# Patient Record
(demographics unavailable — no encounter records)

---

## 2024-11-20 NOTE — PHYSICAL EXAM
[Chaperone Present] : A chaperone was present in the examining room during all aspects of the physical examination [[id number]] : A chaperone was present during the pelvic exam. [Normal] : Anus and perineum: Normal sphincter tone, no masses, no prolapse. [Abnormal] : Recto-Vaginal Exam: Abnormal [Fully active, able to carry on all pre-disease performance without restriction] : Status 0 - Fully active, able to carry on all pre-disease performance without restriction [de-identified] : small umbilical hernia  [de-identified] : Palpable masses in cul de sac, minimal mobility

## 2024-11-20 NOTE — HISTORY OF PRESENT ILLNESS
[FreeTextEntry1] : Referred for bilateral complex adnexal masses by Dr Goss  35 yo with complex bilateral adnexal masses on MRI Known since approx 2019 or 2020.  Both enlarging but L faster than right with internal changes, and solid nodularity.   States she first found out prior to pandemic had what sounds like intermittent torsion but this was March 2020 and further follow up was delayed. Was going to a clinic in [facility] with [facility]. Then went to [facility] and intervention further delayed.   She is asymptomatic. Normal meses reported, some pain and cramps, sometimes heavy, no intermenstrual bleeding. Strongly desires fertility.   MRI 9- Uterine size 10.1 x 4.7 x 5.1 junctional zone masslike thickening pattern posteriorly maximal thickness up to 2.4 cm previously 2.2 cm endometrial canal unremarkable 4 mm maximal thickness. Right adnexa marked architectural distortion complex mass 7.6 x 5.8 x 6.1 previous MRI imaging 6.1 x 4.2 x 6.4  Left adnexa: Abnormally enlarged ovary 8.8 x 8.8 x 7.2 bilocular mass almost entirely replacing the ovary previously measured 6.6 x 5.7 x 5.5 internal architecture features appear to have evolved somewhat since prior examination interval enlargement meant of rounded nodular focus favors Rokitansky nodule.  Irregular internal reticulations suggesting presence of hair Small amount of free fluid No lymphadenopathy Small fat-containing umbilical hernia Impression: Interval enlargement of bilateral ovarian masses more so on the left.  Complete characterization limited due to examination constraints imaging features compatible with bilateral teratomas with significant interval enlargement on the left left side malignant degeneration cannot be excluded.  HCM: Pap: 2024 wnl NILM HPV neg Mammo:n/a C-scope 2017 IBS-C PMH: Obesity, preDM PSH: Denies Gyn Hx: Hx of cysts, adenomyosis  Ob Hx:   Meds: Ozempic Allergies: NKDA FH: maternal grandmother breast SH: Never smoker occ etoh Works as [employer], Single, not sexually active

## 2024-11-20 NOTE — PHYSICAL EXAM
[Chaperone Present] : A chaperone was present in the examining room during all aspects of the physical examination [89331] : A chaperone was present during the pelvic exam. [Normal] : Anus and perineum: Normal sphincter tone, no masses, no prolapse. [Abnormal] : Recto-Vaginal Exam: Abnormal [Fully active, able to carry on all pre-disease performance without restriction] : Status 0 - Fully active, able to carry on all pre-disease performance without restriction [de-identified] : small umbilical hernia  [de-identified] : Palpable masses in cul de sac, minimal mobility

## 2024-11-20 NOTE — HISTORY OF PRESENT ILLNESS
[FreeTextEntry1] : Referred for bilateral complex adnexal masses by Dr Goss  37 yo with complex bilateral adnexal masses on MRI Known since approx 2019 or 2020.  Both enlarging but L faster than right with internal changes, and solid nodularity.   States she first found out prior to pandemic had what sounds like intermittent torsion but this was March 2020 and further follow up was delayed. Was going to a clinic in [facility] with [facility]. Then went to [place] and intervention further delayed.   She is asymptomatic. Normal meses reported, some pain and cramps, sometimes heavy, no intermenstrual bleeding. Strongly desires fertility.   MRI 9- Uterine size 10.1 x 4.7 x 5.1 junctional zone masslike thickening pattern posteriorly maximal thickness up to 2.4 cm previously 2.2 cm endometrial canal unremarkable 4 mm maximal thickness. Right adnexa marked architectural distortion complex mass 7.6 x 5.8 x 6.1 previous MRI imaging 6.1 x 4.2 x 6.4  Left adnexa: Abnormally enlarged ovary 8.8 x 8.8 x 7.2 bilocular mass almost entirely replacing the ovary previously measured 6.6 x 5.7 x 5.5 internal architecture features appear to have evolved somewhat since prior examination interval enlargement meant of rounded nodular focus favors Rokitansky nodule.  Irregular internal reticulations suggesting presence of hair Small amount of free fluid No lymphadenopathy Small fat-containing umbilical hernia Impression: Interval enlargement of bilateral ovarian masses more so on the left.  Complete characterization limited due to examination constraints imaging features compatible with bilateral teratomas with significant interval enlargement on the left left side malignant degeneration cannot be excluded.  HCM: Pap: 2024 wnl NILM HPV neg Mammo:n/a C-scope 2017 IBS-C PMH: Obesity, preDM PSH: Denies Gyn Hx: Hx of cysts, adenomyosis  Ob Hx:   Meds: Ozempic Allergies: NKDA FH: maternal grandmother breast SH: Never smoker occ etoh Works as [employer], Single, not sexually active

## 2024-11-20 NOTE — HISTORY OF PRESENT ILLNESS
[FreeTextEntry1] : Referred for bilateral complex adnexal masses by Dr Goss  35 yo with complex bilateral adnexal masses on MRI Known since approx 2019 or 2020.  Both enlarging but L faster than right with internal changes, and solid nodularity.   States she first found out prior to pandemic had what sounds like intermittent torsion but this was March 2020 and further follow up was delayed. Was going to a clinic in [facility] with [facility]. Then went to [facility] and intervention further delayed.   She is asymptomatic. Normal meses reported, some pain and cramps, sometimes heavy, no intermenstrual bleeding. Strongly desires fertility.   MRI 9- Uterine size 10.1 x 4.7 x 5.1 junctional zone masslike thickening pattern posteriorly maximal thickness up to 2.4 cm previously 2.2 cm endometrial canal unremarkable 4 mm maximal thickness. Right adnexa marked architectural distortion complex mass 7.6 x 5.8 x 6.1 previous MRI imaging 6.1 x 4.2 x 6.4  Left adnexa: Abnormally enlarged ovary 8.8 x 8.8 x 7.2 bilocular mass almost entirely replacing the ovary previously measured 6.6 x 5.7 x 5.5 internal architecture features appear to have evolved somewhat since prior examination interval enlargement meant of rounded nodular focus favors Rokitansky nodule.  Irregular internal reticulations suggesting presence of hair Small amount of free fluid No lymphadenopathy Small fat-containing umbilical hernia Impression: Interval enlargement of bilateral ovarian masses more so on the left.  Complete characterization limited due to examination constraints imaging features compatible with bilateral teratomas with significant interval enlargement on the left left side malignant degeneration cannot be excluded.  HCM: Pap: 2024 wnl NILM HPV neg Mammo:n/a C-scope 2017 IBS-C PMH: Obesity, preDM PSH: Denies Gyn Hx: Hx of cysts, adenomyosis  Ob Hx:   Meds: Ozempic Allergies: NKDA FH: maternal grandmother breast SH: Never smoker occ etoh Works as [employer], Single, not sexually active

## 2024-11-20 NOTE — DISCUSSION/SUMMARY
[Reviewed Clinical Lab Test(s)] : Results of clinical tests were reviewed. [Reviewed Radiology Report(s)] : Radiology reports were reviewed. [Reviewed Radiology Film/Image(s)] : Images from radiology studies were reviewed and examined. [Discuss Tests w/Referring Providers] : Results of labs/radiology studies and the treatment recommendations were discussed with performing/referring physician. [Discuss Alternatives/Risks/Benefits w/Patient] : All alternatives, risks, and benefits were discussed with the patient/family and all questions were answered.  Patient expressed good understanding and appreciates the importance of follow up as recommended. 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   We had an extensive discussion today about management of ovarian cysts in premenopausal women. We reviewed the differential of ovarian cysts in this setting:    Benign ovarian cysts  Ovarian cysts of borderline malignant potential  Malignant ovarian cysts (ovarian cancer)  Benign non-ovarian processes (fallopian tube cysts, fibroids, scar tissue)  Malignant non-ovarian processes (adjacent organ or metastasis from another primary site)   There are several factors that impact the risk of malignancy including the size of the cyst, presence or absence of septations, solid areas, increased vascularity, surface nodularity, or ascites. Elevated tumor markers would also be of concern.   In patients without a suspicious family history, the lifetime risk of ovarian cancer is 1.4%. The risk of cancer increases with increasing age and is more rare in the premenopausal setting. We discussed the inability of screening to detect ovarian cancers early and that the only definitive way to rule out cancer is with histologic diagnosis at the time of surgery.   Discussed findings of interval enlargement MRI report. I discussed that this is suspicious but not diagnostic.  I discussed with the patient surgical excision of the cystic lesions, with bilateral cystectomy.  I discussed in the setting of fertility preservation which she desires would perform cystectomy but I also reviewed possible oophorectomy. I discussed that the benefits of oophorectomy include ability to remove the lesion intact and decrease risk of recurrence of the cystic lesions or precancer or cancerous conditions in the affected ovary.  I discussed the concept of fertility sparing surgical staging should malignancy be present. I discussed that would include removal of affected ovary and tube, lymph nodes, omentum, peritoneal biopsies as well as excision of any other abnormal appearing tissues. I discussed the alternative of full staging with hysterectomy, BSO and procedures above which is standard treatment for high grade cancers.  We discussed her preferences should mahad malignancy be present. She prefers to remove all pelvic organs along with staging if malignancy is detected. We discussed that she can think about this further and let me know if she changes her mind on how she wishes to proceed.  The patient is in agreement with bilateral ovarian cystectomy, possible staging, possible oophorectomy, possible TLH BSO omentectomy, pelvic and paraaortic lymph node dissection, peritoneal biopsies, possible open.   We have discussed the risks of the planned procedure at length as well as the benefits and alternatives.  We discussed risk of bleeding and the possibility of blood transfusions, which the patient is amenable to in the event that it is needed.   We have discussed the possibility of infection, including superficial infection of the wound, the underlying tissues or deep infection in the abdomen or pelvis, as well as urinary tract infection and pneumonia. We discussed the steps taken to mitigate that risk; but the possibility of need for antibiotics, a prolonged hospital stay, hospital readmission, or ICU stay remain. We discussed the individual risk of infection is different for each patient and each procedure.  We have discussed risk of damage to surrounding structures, including the large and small bowel, bladder and ureters, blood vessels, and nerves. We discussed that we attempt to identify and fix any injury at the time of surgery, but that this may involve a more extensive operation that originally planned, the possible involvement of additional surgical subspecialists and a longer hospital stay than is typical.  We discussed risk of problems with healing of wounds related to the surgery. In addition to infection, we discussed wound separation, seroma, hematoma as well as possible scarring when healing leading to unsightly wounds. We discussed care of the wound post-op but that many times these problems cannot be predicted or prevented.  There is an increased risk of VTE and PE related to surgery and hospitalization especially in the setting of a cancer diagnosis. We discussed administration of heparin pre-operatively as well as during hospitalization if necessary to prevent formation of blood clots. We also discussed extended prophylaxis for 28 days post-op, should cancer be present and the patient undergo an open procedure.  We have discussed the post operative expectations for the surgery as planned, but deviations from the surgical plan may be required in order to achieve the surgical goals. This may necessitate additional post operative care, in-hospital or at home as determined by the situation. We discussed possible removal of abnormal appearing tissue in the abdominal cavity if it is seen and appendectomy if abnormal appearing. We discussed that with any minimally invasive or laparoscopic/robotic procedure there is a low, but not zero risk of conversion to an open surgery.  We discussed that there may be medical students, residents and fellows present during her procedure and participating in her post-operative care under my direct supervision. We discussed the importance of individuals participating in the procedure, including myself and assistants, to perform exam under anesthesia.  Plan for robotic assisted bilateral cystectomy, possible staging OR booking   Rhonda Butler MD, FACOG  Gynecologic Oncology

## 2024-11-20 NOTE — PHYSICAL EXAM
[Chaperone Present] : A chaperone was present in the examining room during all aspects of the physical examination [69143] : A chaperone was present during the pelvic exam. [Normal] : Anus and perineum: Normal sphincter tone, no masses, no prolapse. [Abnormal] : Recto-Vaginal Exam: Abnormal [Fully active, able to carry on all pre-disease performance without restriction] : Status 0 - Fully active, able to carry on all pre-disease performance without restriction [de-identified] : small umbilical hernia  [de-identified] : Palpable masses in cul de sac, minimal mobility

## 2024-12-27 NOTE — DISCUSSION/SUMMARY
[Clean] : was clean [Dry] : was dry [Intact] : was intact [None] : had no drainage [Normal Skin] : normal appearance [Doing Well] : is doing well [Excellent Pain Control] : has excellent pain control [No Sign of Infection] : is showing no signs of infection [Firm] : soft [Tender] : nontender [Rebound] : no rebound tenderness [Guarding] : no guarding

## 2024-12-27 NOTE — REASON FOR VISIT
[Post Op] : post op visit [de-identified] : 12/3/24 [de-identified] : STONEY b//l ovarian cystectomy

## 2024-12-27 NOTE — ASSESSMENT
[FreeTextEntry1] : 35 yo s/p RA b/l cystectomy   Recovering well  L sided pain lenny she wakes up, goes away on its own Suspect constipation related  Advised bowel regimen  Increase fluid intake  RTC 4 weeks for reeval   Rhonda Butler MD, FACOG  Gynecologic Oncology  [TextEntry] :  Specimen(s) Submitted 1-Endometrial curettings 2-Right ovarian cyst 3-Left ovarian cyst  Final Diagnosis 1. Endometrium curettings -Secretory phase endometrium   2. Ovary, right, cyst, cystectomy - Mature cystic teratoma  3. Ovary, left, cyst, cystectomy - Mature cystic teratoma

## 2025-01-24 NOTE — DISCUSSION/SUMMARY
[Clean] : was clean [Dry] : was dry [Intact] : was intact [Doing Well] : is doing well [Firm] : soft [Tender] : nontender [Cervical Abnormality] : normal cervix

## 2025-01-24 NOTE — ASSESSMENT
[FreeTextEntry1] : 37 yo s/p RA b/l cystectomy   Recovering well  pain whenever passing stool despite consistency  Suspect constipation related  Will continue to monitor May need GI referral if does not resolve   Rhonda Butler MD, FACOG  Gynecologic Oncology